# Patient Record
Sex: FEMALE | Race: WHITE
[De-identification: names, ages, dates, MRNs, and addresses within clinical notes are randomized per-mention and may not be internally consistent; named-entity substitution may affect disease eponyms.]

---

## 2023-04-20 ENCOUNTER — HOSPITAL ENCOUNTER (OUTPATIENT)
Dept: HOSPITAL 19 - LDRO | Age: 23
Discharge: HOME | End: 2023-04-20
Attending: OBSTETRICS & GYNECOLOGY
Payer: OTHER GOVERNMENT

## 2023-04-20 VITALS — HEART RATE: 93 BPM | DIASTOLIC BLOOD PRESSURE: 60 MMHG | SYSTOLIC BLOOD PRESSURE: 107 MMHG

## 2023-04-20 VITALS — HEIGHT: 55 IN | WEIGHT: 175.27 LBS

## 2023-04-20 VITALS — SYSTOLIC BLOOD PRESSURE: 108 MMHG | HEART RATE: 102 BPM | DIASTOLIC BLOOD PRESSURE: 63 MMHG

## 2023-04-20 VITALS — DIASTOLIC BLOOD PRESSURE: 60 MMHG | SYSTOLIC BLOOD PRESSURE: 113 MMHG | HEART RATE: 107 BPM

## 2023-04-20 VITALS — DIASTOLIC BLOOD PRESSURE: 64 MMHG | TEMPERATURE: 97.6 F | SYSTOLIC BLOOD PRESSURE: 113 MMHG | HEART RATE: 113 BPM

## 2023-04-20 VITALS — SYSTOLIC BLOOD PRESSURE: 110 MMHG | DIASTOLIC BLOOD PRESSURE: 59 MMHG | HEART RATE: 102 BPM

## 2023-04-20 DIAGNOSIS — Z3A.36: ICD-10-CM

## 2023-04-20 LAB
ALBUMIN SERPL-MCNC: 3.1 GM/DL (ref 3.5–5)
ALP SERPL-CCNC: 128 U/L (ref 40–150)
ALT SERPL-CCNC: 7 U/L (ref 0–55)
ANION GAP SERPL CALC-SCNC: 12 MMOL/L (ref 7–16)
AST SERPL-CCNC: 13 U/L (ref 5–34)
BASOPHILS # BLD: 0 K/MM3 (ref 0–0.2)
BASOPHILS NFR BLD AUTO: 0.1 % (ref 0–2)
BILIRUB SERPL-MCNC: 0.3 MG/DL (ref 0.2–1.2)
BUN SERPL-MCNC: 10 MG/DL (ref 7–19)
CALCIUM SERPL-MCNC: 8.9 MG/DL (ref 8.4–10.2)
CHLORIDE SERPL-SCNC: 108 MMOL/L (ref 98–107)
CO2 SERPL-SCNC: 18 MMOL/L (ref 22–29)
CREAT SERPL-SCNC: 0.66 MG/DL (ref 0.57–1.11)
EOSINOPHIL # BLD: 0 K/MM3 (ref 0–0.7)
EOSINOPHIL NFR BLD: 0.3 % (ref 0–4)
ERYTHROCYTE [DISTWIDTH] IN BLOOD BY AUTOMATED COUNT: 12.7 % (ref 11.5–14.5)
GLUCOSE SERPL-MCNC: 98 MG/DL (ref 70–99)
GRANULOCYTES # BLD AUTO: 75.3 % (ref 42.2–75.2)
HCT VFR BLD AUTO: 37.1 % (ref 37–47)
HGB BLD-MCNC: 13 G/DL (ref 12.5–16)
LYMPHOCYTES # BLD: 2.4 K/MM3 (ref 1.2–3.4)
LYMPHOCYTES NFR BLD: 17.6 % (ref 20–51)
MCH RBC QN AUTO: 32 PG (ref 27–31)
MCHC RBC AUTO-ENTMCNC: 35 G/DL (ref 33–37)
MCV RBC AUTO: 90 FL (ref 80–100)
MONOCYTES # BLD: 0.8 K/MM3 (ref 0.1–0.6)
MONOCYTES NFR BLD AUTO: 5.8 % (ref 1.7–9.3)
NEUTROPHILS # BLD: 10.1 K/MM3 (ref 1.4–6.5)
PH UR STRIP.AUTO: 7 [PH] (ref 5–8.5)
PLATELET # BLD AUTO: 169 K/MM3 (ref 130–400)
PMV BLD AUTO: 9.6 FL (ref 7.4–10.4)
POTASSIUM SERPL-SCNC: 3.5 MMOL/L (ref 3.5–4.5)
PROT SERPL-MCNC: 6.3 GM/DL (ref 6.2–8.1)
RBC # BLD AUTO: 4.11 M/MM3 (ref 4.1–5.3)
RBC # UR: (no result) /HPF (ref 0–2)
SODIUM SERPL-SCNC: 138 MMOL/L (ref 136–145)
SP GR UR STRIP.AUTO: 1.01 (ref 1–1.03)
SQUAMOUS # URNS: (no result) /HPF (ref 0–10)
TSH SERPL DL<=0.005 MIU/L-ACNC: 1.33 UIU/ML (ref 0.35–4.94)
URN COLLECT METHOD CLASS: (no result)
UROBILINOGEN UR STRIP.AUTO-MCNC: 0.2 E.U/DL (ref 0.2–1)
WBC # UR: (no result) /HPF (ref 0–2)

## 2023-04-20 NOTE — NUR
SVE unchanged.  at [facility]'s station and reviews FHR strip, maternal
VS and labs. Orders to discharge home recieved. IV DC'd.
5755: Discharge instructions given to pt who verbalizes her understanding. Pt
ambualtory off unit and home.

## 2023-04-20 NOTE — NUR
G2L1. 36.2. Ambulatory to LDR 5. Clean gown on. EFM and TOCO explained and
applied. Pt states she has been having contractions for the past 2 hours and
they have been 3 mins apart. Pt states she was down on [place] over the weekend
and was seen in the hospital for contractions. Reports getting one dose of
betamethasone and reports SVE 1/50. Pt denies leaking of fluids or vaginal
bleeding. Reports good fetal movement. Plan of care explained to pt who
verbalizes her understanding.
2115:  at nurses's station and reviews FHR strip and maternal VS.
Orders received. See physican notification. Pt updated on new plan of care. Pt
does reports having an elevated pulse all the of the time in the 90s-100s.
2130: IV started and labs obtained. LR bolus infusing without difficulty.

## 2023-05-10 ENCOUNTER — HOSPITAL ENCOUNTER (OUTPATIENT)
Dept: HOSPITAL 19 - LDRO | Age: 23
Discharge: HOME | End: 2023-05-10
Attending: OBSTETRICS & GYNECOLOGY
Payer: OTHER GOVERNMENT

## 2023-05-10 VITALS — HEART RATE: 96 BPM | TEMPERATURE: 98.2 F | SYSTOLIC BLOOD PRESSURE: 111 MMHG | DIASTOLIC BLOOD PRESSURE: 67 MMHG

## 2023-05-10 VITALS — DIASTOLIC BLOOD PRESSURE: 67 MMHG | SYSTOLIC BLOOD PRESSURE: 111 MMHG | TEMPERATURE: 98.1 F | HEART RATE: 96 BPM

## 2023-05-10 VITALS — HEIGHT: 65.98 IN | WEIGHT: 177.47 LBS | BODY MASS INDEX: 28.52 KG/M2

## 2023-05-10 DIAGNOSIS — O42.92: Primary | ICD-10-CM

## 2023-05-10 DIAGNOSIS — Z3A.39: ICD-10-CM

## 2023-05-10 NOTE — NUR
1540- RN CALLS NEWCOMER MD AND INFORMS MD OF PT ARRIVAL, COMPLAINTS, ROM +
COLLECTED, SVE 1.5/50/-3, FHTS REACTIVE, CTX PATTERN, PT DEMEANOR. ORDERS TO
SEND ROM + AND RECHECK PT IN ONE HOUR. RN NINAVO.

## 2023-05-10 NOTE — NUR
BRAYAN UPDATED ON SVE RECHECK UNCHANGED AND ROM + NEGATIVE RESULTS, FHTS,
CTX PATTERN. ORDERS FROM MD IS TO D/C PT HOME AT THIS TIME.

## 2023-05-10 NOTE — NUR
1515- PT ARRIVES ON UNIT WITH FOB COMPLAINING OF LEAKING OF FLUID SINCE LAST
NIGHT AROUND 1800. PT REPORTS OCCASIONAL CONTRACTIONS THAT SHE RATES A 3 OUT
OF 10 BUT STATES SHE HAS BEEN NITESH ON AND OFF FOR WEEKS NOW. PT INFORMS
RN THAT SHE RECIEVED 1 DOSE OF BETA IN [place] AROUND 36-37 WEEKS DUE TO
CONTRACTIONS AND IS GBS NEGATIVE. PT DENIES VB, DFM, OR ANY OTHER COMPLAINTS
AT THIS TIME. PT STATES SHE OCCASIONALLY WILL TRICKLE YELLOW/CLEARISH FLUID
BUT DENIES ANY BIG GUSHES. PT APPEARS CALM AND RELAXED.

## 2023-05-10 NOTE — NUR
1700 PT GIVEN BOTH WRITTEN AND VERBAL DISCHARGE INSTRUCTIONS. PT INSTRUCTED TO
CALL/COME BACK IF PT NOTICES ANY LOF, VB, STRONG/REGULAR CTX, DFM, OR ANY
OTHER CHANGES/CONCERNS. PT VERBALIZES UNDERSTANDING AND HAS NO QUESTIONS AT
THIS TIME.

## 2023-05-14 ENCOUNTER — HOSPITAL ENCOUNTER (OUTPATIENT)
Dept: HOSPITAL 19 - LDRO | Age: 23
Discharge: HOME | End: 2023-05-14
Attending: OBSTETRICS & GYNECOLOGY
Payer: OTHER GOVERNMENT

## 2023-05-14 VITALS — HEART RATE: 95 BPM

## 2023-05-14 VITALS — HEART RATE: 100 BPM | SYSTOLIC BLOOD PRESSURE: 109 MMHG | DIASTOLIC BLOOD PRESSURE: 64 MMHG

## 2023-05-14 VITALS — TEMPERATURE: 97.7 F | SYSTOLIC BLOOD PRESSURE: 111 MMHG | HEART RATE: 111 BPM | DIASTOLIC BLOOD PRESSURE: 62 MMHG

## 2023-05-14 VITALS — HEART RATE: 85 BPM | SYSTOLIC BLOOD PRESSURE: 116 MMHG | DIASTOLIC BLOOD PRESSURE: 74 MMHG

## 2023-05-14 VITALS — BODY MASS INDEX: 28.98 KG/M2 | HEIGHT: 65.98 IN | WEIGHT: 180.34 LBS

## 2023-05-14 VITALS — HEART RATE: 88 BPM | SYSTOLIC BLOOD PRESSURE: 115 MMHG | DIASTOLIC BLOOD PRESSURE: 72 MMHG

## 2023-05-14 VITALS — HEART RATE: 92 BPM | SYSTOLIC BLOOD PRESSURE: 116 MMHG | DIASTOLIC BLOOD PRESSURE: 71 MMHG

## 2023-05-14 DIAGNOSIS — Z3A.39: ICD-10-CM

## 2023-05-14 NOTE — NUR
Discharge information given on early labor.  Pt instructed to return to
hospital with leaking of fluid, vaginal bleeding, decrease in baby's
movements, or strong contractions she is unable to walk/talk through every 3-5
minutes.  Pt verbalizes understanding, states she is going to eat dinner and
walk.  Pt and spouse left unit ambulatory.

## 2023-05-14 NOTE — NUR
Repeat SVE unchanged.  Pt reports contractions are getting stronger.
Contractions are more frequent than on arrival.  Dr. Lazar notified, see
physician notification.  Plan to watch pt for another hour.  Pt informed and
verbalizes understanding.

## 2023-05-14 NOTE — NUR
Pt and spouse arrive ambulatory to unit at 1450.  Pt changes into gown, EFM
explained and placed.  VS taken.  Pt reports contractions since about 1300
every 3-4 minutes and reports some pink tinged discharge about 1300 as well.
Pt also reports nausea this morning, but that has resolved.  SVE 1-2/70/-2.
Pt questions leaking of fluid, however bag felt around baby during cervical
check and no pooling of fluid noticed on glove.  Assessment done, pt informed
of labor check process and verbalizes understanding.  Denies needs/questions
at this time.

## 2023-05-14 NOTE — NUR
Repeat SVE 1-2/70-80/-2.  Dr. Lazar on unit and informed, FHR strip reviewed
by provider.  Per Dr. Lazar, pt may go home, pt may stay and walk unit, or
pt may stay and have therapeutic rest.  Options presented to pt and spouse,
they wish to have time to discuss.